# Patient Record
(demographics unavailable — no encounter records)

---

## 2025-07-28 NOTE — DISCUSSION/SUMMARY
[FreeTextEntry1] : 1.  Hypertension: Blood pressure not at goal however patient did not take his blood pressure medication today.  Advised to take it regularly follow-up with PCP for blood pressure management. 2.  Hyperlipidemia: Advised to get copy of recent fasting lipid panel to review 3.  Cardiac murmur: Echocardiogram 4.  Carotid bruit: Carotid duplex 5.  Family history of early atherosclerotic heart disease, will obtain an ETT and referral for a coronary calcium score. [EKG obtained to assist in diagnosis and management of assessed problem(s)] : EKG obtained to assist in diagnosis and management of assessed problem(s)

## 2025-07-28 NOTE — PHYSICAL EXAM

## 2025-07-28 NOTE — HISTORY OF PRESENT ILLNESS
[FreeTextEntry1] : 66-year-old male with a past medical history of hypertension hyperlipidemia and early atherosclerotic heart disease in his father and then his first MI at the age of 50 comes in to establish care.  Overall, he feels well denies any chest pain shortness of breath PND orthopnea.